# Patient Record
Sex: FEMALE | Race: WHITE | NOT HISPANIC OR LATINO
[De-identification: names, ages, dates, MRNs, and addresses within clinical notes are randomized per-mention and may not be internally consistent; named-entity substitution may affect disease eponyms.]

---

## 2021-06-25 PROBLEM — Z00.00 ENCOUNTER FOR PREVENTIVE HEALTH EXAMINATION: Status: ACTIVE | Noted: 2021-06-25

## 2021-06-28 ENCOUNTER — APPOINTMENT (OUTPATIENT)
Dept: OBGYN | Facility: CLINIC | Age: 51
End: 2021-06-28
Payer: COMMERCIAL

## 2021-06-28 VITALS
WEIGHT: 167 LBS | SYSTOLIC BLOOD PRESSURE: 118 MMHG | DIASTOLIC BLOOD PRESSURE: 78 MMHG | HEIGHT: 66 IN | BODY MASS INDEX: 26.84 KG/M2

## 2021-06-28 DIAGNOSIS — N85.2 HYPERTROPHY OF UTERUS: ICD-10-CM

## 2021-06-28 DIAGNOSIS — Z01.419 ENCOUNTER FOR GYNECOLOGICAL EXAMINATION (GENERAL) (ROUTINE) W/OUT ABNORMAL FINDINGS: ICD-10-CM

## 2021-06-28 PROCEDURE — 76830 TRANSVAGINAL US NON-OB: CPT

## 2021-06-28 PROCEDURE — 81003 URINALYSIS AUTO W/O SCOPE: CPT | Mod: QW

## 2021-06-28 PROCEDURE — 99396 PREV VISIT EST AGE 40-64: CPT | Mod: 25

## 2021-06-28 NOTE — HISTORY OF PRESENT ILLNESS
[perimenopausal] : perimenopausal [N] : Patient reports normal menses [Tubal Occlusion] : has had a tubal occlusion [Y] : Positive pregnancy history [Currently Active] : currently active [Men] : men [Vaginal] : vaginal [No] : No [TextBox_102] : skipping months [LMPDate] : 6/14/21 [PGHxTotal] : 3 [[facility] [FreeTextEntry1] : 06/14/21

## 2021-06-28 NOTE — PROCEDURE
[Cervical Pap Smear] : cervical Pap smear [Liquid Base] : liquid base [GC & Chlamydia via Pap] : GC & Chlamydia via Pap [Tolerated Well] : the patient tolerated the procedure well [No Complications] : there were no complications [Fibroid Uterus] : fibroid uterus [Anteverted] : anteverted [FreeTextEntry5] : 224cc vol    1.6 x 1.9 x1.7cm anterior fibroid   stable w/o growth [FreeTextEntry7] : 2.4cc [FreeTextEntry8] : 1.9cc

## 2021-06-30 LAB
BILIRUB UR QL STRIP: NORMAL
CLARITY UR: CLEAR
COLLECTION METHOD: NORMAL
GLUCOSE UR-MCNC: NORMAL
HCG UR QL: 0.2 EU/DL
HGB UR QL STRIP.AUTO: NORMAL
KETONES UR-MCNC: NORMAL
LEUKOCYTE ESTERASE UR QL STRIP: NORMAL
NITRITE UR QL STRIP: NORMAL
PH UR STRIP: 6
PROT UR STRIP-MCNC: NORMAL
SP GR UR STRIP: 1

## 2021-07-05 LAB
C TRACH RRNA SPEC QL NAA+PROBE: NOT DETECTED
HPV HIGH+LOW RISK DNA PNL CVX: NOT DETECTED
N GONORRHOEA RRNA SPEC QL NAA+PROBE: NOT DETECTED
SOURCE AMPLIFICATION: NORMAL
SOURCE AMPLIFICATION: NORMAL
T VAGINALIS RRNA SPEC QL NAA+PROBE: NOT DETECTED

## 2021-07-07 LAB — CYTOLOGY CVX/VAG DOC THIN PREP: NORMAL

## 2023-05-04 ENCOUNTER — APPOINTMENT (OUTPATIENT)
Dept: OBGYN | Facility: CLINIC | Age: 53
End: 2023-05-04
Payer: COMMERCIAL

## 2023-05-04 VITALS
HEIGHT: 66 IN | SYSTOLIC BLOOD PRESSURE: 120 MMHG | BODY MASS INDEX: 26.68 KG/M2 | DIASTOLIC BLOOD PRESSURE: 80 MMHG | WEIGHT: 166 LBS

## 2023-05-04 DIAGNOSIS — R92.2 INCONCLUSIVE MAMMOGRAM: ICD-10-CM

## 2023-05-04 LAB
BILIRUB UR QL STRIP: NEGATIVE
CLARITY UR: CLEAR
COLLECTION METHOD: NORMAL
GLUCOSE UR-MCNC: NEGATIVE
HCG UR QL: 0.2 EU/DL
HGB UR QL STRIP.AUTO: NORMAL
KETONES UR-MCNC: NEGATIVE
LEUKOCYTE ESTERASE UR QL STRIP: NEGATIVE
NITRITE UR QL STRIP: NEGATIVE
PH UR STRIP: 5.5
PROT UR STRIP-MCNC: NEGATIVE
SP GR UR STRIP: 1.02

## 2023-05-04 PROCEDURE — 81003 URINALYSIS AUTO W/O SCOPE: CPT | Mod: QW

## 2023-05-04 PROCEDURE — 99396 PREV VISIT EST AGE 40-64: CPT

## 2023-05-04 NOTE — PHYSICAL EXAM
[Chaperone Present] : A chaperone was present in the examining room during all aspects of the physical examination [Appropriately responsive] : appropriately responsive [Alert] : alert [No Acute Distress] : no acute distress [Soft] : soft [Non-tender] : non-tender [Non-distended] : non-distended [Oriented x3] : oriented x3 [Examination Of The Breasts] : a normal appearance [No Discharge] : no discharge [No Masses] : no breast masses were palpable [Labia Majora] : normal [Labia Minora] : normal [Normal] : normal [Uterine Adnexae] : normal

## 2023-05-04 NOTE — HISTORY OF PRESENT ILLNESS
[FreeTextEntry1] : annual visit\par no complaints\par LMP 4/11/23, prior to that it was in october 2022\par needs bilateral breast sonogram, was told to f/u in 6 months, previous report with her PCP [perimenopausal] : perimenopausal [N] : Patient reports normal menses [Tubal Occlusion] : has had a tubal occlusion [Y] : Positive pregnancy history [PapSmeardate] : 0/2021 [TextBox_102] : skipping months [LMPDate] : 4/11/23 [PGHxTotal] : 3 [[facility] [Night Sweats] : night sweats [Difficulty with [place]] : difficulty with intercourse [Currently Active] : currently active [Men] : men [Vaginal] : vaginal [No] : No

## 2023-05-06 LAB
APPEARANCE: CLEAR
BACTERIA UR CULT: NORMAL
BILIRUBIN URINE: NEGATIVE
BLOOD URINE: NEGATIVE
COLOR: YELLOW
GLUCOSE QUALITATIVE U: NEGATIVE MG/DL
KETONES URINE: NEGATIVE MG/DL
LEUKOCYTE ESTERASE URINE: NEGATIVE
NITRITE URINE: NEGATIVE
PH URINE: 6
PROTEIN URINE: NEGATIVE MG/DL
SPECIFIC GRAVITY URINE: 1.02
UROBILINOGEN URINE: 0.2 MG/DL

## 2023-05-08 LAB
C TRACH RRNA SPEC QL NAA+PROBE: NOT DETECTED
HPV HIGH+LOW RISK DNA PNL CVX: NOT DETECTED
N GONORRHOEA RRNA SPEC QL NAA+PROBE: NOT DETECTED
SOURCE AMPLIFICATION: NORMAL

## 2023-05-09 LAB — CYTOLOGY CVX/VAG DOC THIN PREP: NORMAL

## 2023-10-11 ENCOUNTER — NON-APPOINTMENT (OUTPATIENT)
Age: 53
End: 2023-10-11

## 2024-03-22 ENCOUNTER — NON-APPOINTMENT (OUTPATIENT)
Age: 54
End: 2024-03-22

## 2024-04-30 ENCOUNTER — APPOINTMENT (OUTPATIENT)
Dept: BREAST CENTER | Facility: CLINIC | Age: 54
End: 2024-04-30
Payer: COMMERCIAL

## 2024-04-30 VITALS
WEIGHT: 167 LBS | DIASTOLIC BLOOD PRESSURE: 74 MMHG | HEIGHT: 66 IN | SYSTOLIC BLOOD PRESSURE: 146 MMHG | BODY MASS INDEX: 26.84 KG/M2

## 2024-04-30 DIAGNOSIS — N60.01 SOLITARY CYST OF RIGHT BREAST: ICD-10-CM

## 2024-04-30 DIAGNOSIS — N60.02 SOLITARY CYST OF RIGHT BREAST: ICD-10-CM

## 2024-04-30 DIAGNOSIS — Z12.39 ENCOUNTER FOR OTHER SCREENING FOR MALIGNANT NEOPLASM OF BREAST: ICD-10-CM

## 2024-04-30 DIAGNOSIS — R92.8 OTHER ABNORMAL AND INCONCLUSIVE FINDINGS ON DIAGNOSTIC IMAGING OF BREAST: ICD-10-CM

## 2024-04-30 PROCEDURE — 99204 OFFICE O/P NEW MOD 45 MIN: CPT

## 2024-04-30 NOTE — HISTORY OF PRESENT ILLNESS
[FreeTextEntry1] : RICH GOODMAN is a 53 year old female patient who presents today for initial  consultation for BIRADS-3 imaging review.  She was referred by her gynecologist, Merary Brito.  FHx: Sister - dx'd with DCIS at age 42. (sister was BRCA negative)  [facility]: 10 yr - 8.7% Life - 23.8%  Her imaging is as follows: 2/13/23: B/L Screening Mammo & Sono --> BIRADS 3 -Breast density: Heterogeneously dense, which may obscure small masses. -No suspicious masses, calcifications, or other findings are seen. Mammo BI-RADS 1: NEGATIVE US BREAST COMPLETE BILATERAL Right breast: -0.5 cm hypoechoic lesion, possibly representing a complex cyst, 8:00, 2 cm from the nipple (perhaps previously 7:00, 7 cm from the nipple). -0.8 cm hypoechoic lesion, possibly representing a complex cyst with debris, retroareolar right (perhaps previously 1 cm simple cyst in the retroareolar right breast) Left breast: -0.6 cm hypoechoic lesion, possibly representing a complex cyst with debris, 12:00, 1 cm from the nipple (perhaps previously 12:00, 2 cm from the nipple). Six-month follow-up targeted bilateral breast ultrasound is recommended.   8/29/23: B/L Breast Sono --> BIRADS 3 Right breast: -Stable 0.5 cm cyst with debris, 8:00, 2 cm from the nipple. -Stable 0.8 cm cyst with debris in the retroareolar right breast. Left breast:  -Stable 0.5 cm hypoechoic lesion, possible cyst, 12:00, 1 cm from the nipple.  -Stable 0.4 cm hypoechoic lesion, possible cyst, 6:00, 1 cm from the nipple. -Stable 0.6 cm hypoechoic lesion, possible cyst, 8:00, 1 cm from the nipple.   3/8/24: B/L Dx Mammo & Sono --> BIRADS 3 -Breast Density: Heterogeneously dense, which may obscure small masses. -No suspicious masses, calcifications, or other findings are seen. US BREAST COMPLETE BILATERAL -There are stable bilateral well-circumscribed round and oval benign-appearing nodules likely representing complicated cysts.  -No suspicious abnormalities were seen sonographically. A 6 month follow-up ultrasound of both breast(s) is recommended.  Denies any current complaints. No new changes to her breasts.

## 2024-04-30 NOTE — PHYSICAL EXAM
[Normocephalic] : normocephalic [EOMI] : extra ocular movement intact [No Supraclavicular Adenopathy] : no supraclavicular adenopathy [No Cervical Adenopathy] : no cervical adenopathy [Examined in the supine and seated position] : examined in the supine and seated position [No dominant masses] : no dominant masses in right breast  [No dominant masses] : no dominant masses left breast [No Nipple Retraction] : no left nipple retraction [No Nipple Discharge] : no left nipple discharge [No Axillary Lymphadenopathy] : no left axillary lymphadenopathy [No Rashes] : no rashes [No Ulceration] : no ulceration [de-identified] : NL respirations

## 2024-04-30 NOTE — ASSESSMENT
[FreeTextEntry1] : Patient is a 53F with BIRADS 3 imaging.  She underwent bilateral scg mmg/us in 2/2023 which showed bilateral possible complex cysts (BIRADS 3) with US recommended in 6 months.  She underwent a bilateral US in 8/2023 which showed possible bilateral breast cysts (BIRADS 3).  She had bilateral mmg/us in 3/2024 which showed stable bilateral nodules, likely complicate cysts (BIRADS 3) with bilateral Us recommended in 6 months.  We discussed her imaging in detail.  We discussed breast cysts. They are not pre-malignant nor do they have malignant potential and are hormonally influenced. They may grow or shrink in size as well as resolve spontaneously and there is usually no intervention unless they are symptomatic. In several large studies, patients with breast cysts and a positive family history had a higher relative risk of breast cancer (from 1.5 to 3).  I explained to the patient the BIRADS system of grading and that her findings fall into category 3. Category 3 carries a less than 5% risk of malignancy. She can choose to follow up imaging. She is also aware that she can also choose to biopsy the lesion if she wishes to.  She understands her options and wants to proceed with observation at this time.  We discussed high risk screening. The use of MRIs have not been shown to prolong survival, however out of 1000 women screened, an additional 14-15 cancers will be identified. The use of MRIs, has, however, been shown to increase the number of procedures and additional imaging because although it is a very sensitive test, it is not as specific. I also discussed that some patients could have an allergic reaction to gadolinium, or affect the kidneys, and gadolinium has been found to be deposited in the brain of patients who undergo many MRIs in their lifetime, although the effects are currently understudy. She wishes to proceed with MRIs at this time.  All questions and concerns were answered in detail.  Patient is for bilateral US in 9/2024.  She is for MRI in 9/2024.  She is for follow up after imaging, pending any interval changes.  Total time spent on encounter was greater than 45 minutes , which included face to face time with the patient, performing an exam, reviewing previous medical records, reviewing current imaging/ pathology, documenting in patient record and coordinating care/imaging. Greater than 50% of the encounter was spent on counseling and coordination of her breast issue.

## 2024-04-30 NOTE — DATA REVIEWED
[FreeTextEntry1] : B/L Screening Mammo & Sono - 02/13/2023: MAMMOGRAM:    Tomosynthesis 3D and 2D imaging of the breast(s) were performed.  Current study was also evaluated with a computer aided detection (CAD) system.   Breast density: Heterogeneously dense, which may obscure small masses.   No suspicious masses, calcifications, or other findings are seen.   Mammo BI-RADS 1: NEGATIVE     US BREAST COMPLETE BILATERAL   Ultrasound evaluation was performed including examination of all four quadrants of the breast(s) and the retroareolar region(s).   Right breast: 0.5 cm hypoechoic lesion, possibly representing a complex cyst, 8:00, 2 cm from the nipple (perhaps previously 7:00, 7 cm from the nipple).   0.8 cm hypoechoic lesion, possibly representing a complex cyst with debris, retroareolar right (perhaps previously 1 cm simple cyst in the retroareolar right breast)   Left breast: 0.6 cm hypoechoic lesion, possibly representing a complex cyst with debris, 12:00, 1 cm from the nipple (perhaps previously 12:00, 2 cm from the nipple).   Six-month follow-up targeted bilateral breast ultrasound is recommended   Ultrasound BI-RADS 3: PROBABLY BENIGN OVERALL IMPRESSION: OVERALL BI-RADS 3: PROBABLY BENIGN   Recommend 6 month follow-up targeted bilateral breast ultrasound for above-described hypoechoic lesions, possibly representing complex cysts with debris..   A 6 month follow-up of both breast(s) is recommended.   B/L Breast Sono - 08/29/2023: A limited ultrasound evaluation of the breast(s) was/were performed.   Right breast: Stable 0.5 cm cyst with debris, 8:00, 2 cm from the nipple Stable 0.8 cm cyst with debris in the retroareolar right breast   Left breast:  Stable 0.5 cm hypoechoic lesion, possible cyst, 12:00, 1 cm from the nipple  Stable 0.4 cm hypoechoic lesion, possible cyst, 6:00, 1 cm from the nipple Stable 0.6 cm hypoechoic lesion, possible cyst, 8:00, 1 cm from the nipple IMPRESSION:    Stable probable complex cysts in both breasts. Additional six-month follow-up is recommended at the time of the patient's annual bilateral mammogram and breast ultrasound in February 2024   A 6 month follow-up of both breast(s) is recommended.    A letter will be sent to the patient to return for follow up.   BI-RADS 3: PROBABLY BENIGN   B/L Dx Mammo & Sono - 03/08/2024: MAMMOGRAM:    Tomosynthesis 3D and 2D imaging of the breast(s) were performed.  Current study was also evaluated with a computer aided detection (CAD) system.   Breast Density: Heterogeneously dense, which may obscure small masses.   No suspicious masses, calcifications, or other findings are seen.   US BREAST COMPLETE BILATERAL   Ultrasound evaluation was performed including examination of all four quadrants of the breast(s) and the retroareolar regions.   Color flow, Gray scale and real-time ultrasound of both breasts was performed.    There are stable bilateral well-circumscribed round and oval benign-appearing nodules likely representing complicated cysts. No suspicious abnormalities were seen sonographically. OVERALL IMPRESSION:    Stable bilateral lesions likely representing complicated cysts. A six-month follow-up bilateral targeted breast ultrasound is advised.   OVERALL BI-RADS 3: PROBABLY BENIGN   A 6 month follow-up ultrasound of both breast(s) is recommended.

## 2024-05-20 ENCOUNTER — APPOINTMENT (OUTPATIENT)
Dept: OBGYN | Facility: CLINIC | Age: 54
End: 2024-05-20
Payer: COMMERCIAL

## 2024-05-20 VITALS
BODY MASS INDEX: 26.68 KG/M2 | WEIGHT: 166 LBS | HEART RATE: 75 BPM | HEIGHT: 66 IN | SYSTOLIC BLOOD PRESSURE: 114 MMHG | DIASTOLIC BLOOD PRESSURE: 75 MMHG

## 2024-05-20 DIAGNOSIS — Z01.419 ENCOUNTER FOR GYNECOLOGICAL EXAMINATION (GENERAL) (ROUTINE) W/OUT ABNORMAL FINDINGS: ICD-10-CM

## 2024-05-20 DIAGNOSIS — N95.1 MENOPAUSAL AND FEMALE CLIMACTERIC STATES: ICD-10-CM

## 2024-05-20 DIAGNOSIS — N94.11 SUPERFICIAL (INTROITAL) DYSPAREUNIA: ICD-10-CM

## 2024-05-20 DIAGNOSIS — R31.29 OTHER MICROSCOPIC HEMATURIA: ICD-10-CM

## 2024-05-20 LAB
BILIRUB UR QL STRIP: NORMAL
CLARITY UR: CLEAR
COLLECTION METHOD: NORMAL
GLUCOSE UR-MCNC: NORMAL
HCG UR QL: 0.2 EU/DL
HGB UR QL STRIP.AUTO: ABNORMAL
KETONES UR-MCNC: NORMAL
LEUKOCYTE ESTERASE UR QL STRIP: NORMAL
NITRITE UR QL STRIP: NORMAL
PH UR STRIP: 6.5
PROT UR STRIP-MCNC: NORMAL
SP GR UR STRIP: 1.02

## 2024-05-20 PROCEDURE — 81003 URINALYSIS AUTO W/O SCOPE: CPT | Mod: QW

## 2024-05-20 PROCEDURE — 99396 PREV VISIT EST AGE 40-64: CPT | Mod: 25

## 2024-05-20 PROCEDURE — 99459 PELVIC EXAMINATION: CPT

## 2024-05-20 RX ORDER — CONJUGATED ESTROGENS 0.62 MG/G
0.62 CREAM VAGINAL
Qty: 1 | Refills: 2 | Status: ACTIVE | COMMUNITY
Start: 2024-05-20 | End: 1900-01-01

## 2024-05-20 NOTE — PHYSICAL EXAM
[Chaperone Present] : A chaperone was present in the examining room during all aspects of the physical examination [[id number]] : A chaperone was present during the pelvic exam. [FreeTextEntry2] : NUPUR Morgan [Appropriately responsive] : appropriately responsive [Alert] : alert [No Acute Distress] : no acute distress [Soft] : soft [Non-tender] : non-tender [Non-distended] : non-distended [Oriented x3] : oriented x3 [Examination Of The Breasts] : a normal appearance [No Discharge] : no discharge [No Masses] : no breast masses were palpable [Labia Majora] : normal [Labia Minora] : normal [Normal] : normal [Uterine Adnexae] : normal

## 2024-05-20 NOTE — HISTORY OF PRESENT ILLNESS
[FreeTextEntry1] : pt presents today for annual exam in menopause , LMP 4/11/2023, c/o vasomotor symptoms [postmenopausal] : postmenopausal [N] : Patient reports normal menses [Tubal Occlusion] : has had a tubal occlusion [Y] : Positive pregnancy history [PapSmeardate] : 0/2021 [LMPDate] : 4/11/23 [PGHxTotal] : 3 [[facility]] : 3 [Hot Flashes] : hot flashes [Night Sweats] : night sweats [Difficulty with [place]] : difficulty with intercourse [Options Discussed] : options discussed [Currently In Menopause] : currently in menopause [Experiencing Menopausal Sxs] : experiencing menopausal symptoms [Menopause Age: ____] : age at menopause was [unfilled] [Currently Active] : currently active [Men] : men [Vaginal] : vaginal [No] : No

## 2024-05-20 NOTE — PLAN
[FreeTextEntry1] : start Premarin cream.  Reviewed instructions for use for medications prescribed today. Patient understood

## 2024-05-20 NOTE — DISCUSSION/SUMMARY
[FreeTextEntry1] : Due to pts breast hx , Birads 3 mammo, I am not recommending HRT at this visit. Pt is due for a MRI of breasts in October. Her sister has a hx of DCIS, BrCa(-). She is being followed by Dr Silverman. I recommended she start estrogen cream for dyspareunia. Pt has agreed to treatment. Recommended Bonafide non-hormonal products as well as alternative.

## 2024-05-23 LAB
APPEARANCE: CLEAR
BACTERIA UR CULT: NORMAL
BILIRUBIN URINE: NEGATIVE
BLOOD URINE: NEGATIVE
C TRACH RRNA SPEC QL NAA+PROBE: NOT DETECTED
COLOR: YELLOW
CYTOLOGY CVX/VAG DOC THIN PREP: NORMAL
GLUCOSE QUALITATIVE U: NEGATIVE MG/DL
HPV HIGH+LOW RISK DNA PNL CVX: NOT DETECTED
KETONES URINE: NEGATIVE MG/DL
LEUKOCYTE ESTERASE URINE: NEGATIVE
N GONORRHOEA RRNA SPEC QL NAA+PROBE: NOT DETECTED
NITRITE URINE: NEGATIVE
PH URINE: 6.5
PROTEIN URINE: NEGATIVE MG/DL
SOURCE TP AMPLIFICATION: NORMAL
SPECIFIC GRAVITY URINE: 1.01
UROBILINOGEN URINE: 0.2 MG/DL

## 2025-06-02 ENCOUNTER — NON-APPOINTMENT (OUTPATIENT)
Age: 55
End: 2025-06-02

## 2025-06-02 ENCOUNTER — APPOINTMENT (OUTPATIENT)
Dept: OBGYN | Facility: CLINIC | Age: 55
End: 2025-06-02
Payer: COMMERCIAL

## 2025-06-02 VITALS
HEART RATE: 93 BPM | SYSTOLIC BLOOD PRESSURE: 115 MMHG | WEIGHT: 175 LBS | HEIGHT: 66 IN | DIASTOLIC BLOOD PRESSURE: 79 MMHG | BODY MASS INDEX: 28.12 KG/M2

## 2025-06-02 DIAGNOSIS — Z12.39 ENCOUNTER FOR OTHER SCREENING FOR MALIGNANT NEOPLASM OF BREAST: ICD-10-CM

## 2025-06-02 DIAGNOSIS — Z78.9 OTHER SPECIFIED HEALTH STATUS: ICD-10-CM

## 2025-06-02 DIAGNOSIS — Z01.419 ENCOUNTER FOR GYNECOLOGICAL EXAMINATION (GENERAL) (ROUTINE) W/OUT ABNORMAL FINDINGS: ICD-10-CM

## 2025-06-02 DIAGNOSIS — R92.30 DENSE BREASTS, UNSPECIFIED: ICD-10-CM

## 2025-06-02 LAB
BILIRUB UR QL STRIP: NEGATIVE
CLARITY UR: CLEAR
COLLECTION METHOD: NORMAL
GLUCOSE UR-MCNC: NEGATIVE
HCG UR QL: 0.2 EU/DL
HGB UR QL STRIP.AUTO: NEGATIVE
KETONES UR-MCNC: NEGATIVE
LEUKOCYTE ESTERASE UR QL STRIP: NEGATIVE
NITRITE UR QL STRIP: NEGATIVE
PH UR STRIP: 6
PROT UR STRIP-MCNC: NEGATIVE
SP GR UR STRIP: 1.01

## 2025-06-02 PROCEDURE — 99459 PELVIC EXAMINATION: CPT | Mod: NC

## 2025-06-02 PROCEDURE — 99396 PREV VISIT EST AGE 40-64: CPT | Mod: 25

## 2025-06-02 PROCEDURE — 81003 URINALYSIS AUTO W/O SCOPE: CPT | Mod: QW

## 2025-06-06 LAB
C TRACH RRNA SPEC QL NAA+PROBE: NOT DETECTED
HPV HIGH+LOW RISK DNA PNL CVX: NOT DETECTED
N GONORRHOEA RRNA SPEC QL NAA+PROBE: NOT DETECTED
SOURCE TP AMPLIFICATION: NORMAL

## 2025-06-13 LAB — CYTOLOGY CVX/VAG DOC THIN PREP: NORMAL
